# Patient Record
Sex: FEMALE | Race: WHITE
[De-identification: names, ages, dates, MRNs, and addresses within clinical notes are randomized per-mention and may not be internally consistent; named-entity substitution may affect disease eponyms.]

---
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
## 2020-03-27 NOTE — NUR
PT DISCHARGED TO HOME W/ MEDS ET INSTR.  PT TO F/U W/ PCP ET RETURN IF SYMPTOMS 
CHANGE OR GET WORSE.  UNDERSTANDING VOICED, NO QUESTIONS.

## 2021-09-27 ENCOUNTER — HOSPITAL ENCOUNTER (EMERGENCY)
Dept: HOSPITAL 75 - ER | Age: 31
Discharge: HOME | End: 2021-09-27
Payer: COMMERCIAL

## 2021-09-27 VITALS — WEIGHT: 165.35 LBS | HEIGHT: 67.99 IN | BODY MASS INDEX: 25.06 KG/M2

## 2021-09-27 VITALS — SYSTOLIC BLOOD PRESSURE: 118 MMHG | DIASTOLIC BLOOD PRESSURE: 71 MMHG

## 2021-09-27 DIAGNOSIS — M25.562: ICD-10-CM
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
## 2021-09-27 DIAGNOSIS — M25.532: ICD-10-CM

## 2021-09-27 DIAGNOSIS — M54.2: ICD-10-CM

## 2021-09-27 DIAGNOSIS — R07.89: Primary | ICD-10-CM

## 2021-09-27 DIAGNOSIS — M79.644: ICD-10-CM

## 2021-09-27 PROCEDURE — 73140 X-RAY EXAM OF FINGER(S): CPT

## 2021-09-27 PROCEDURE — 72170 X-RAY EXAM OF PELVIS: CPT

## 2021-09-27 PROCEDURE — 73562 X-RAY EXAM OF KNEE 3: CPT

## 2021-09-27 PROCEDURE — 71045 X-RAY EXAM CHEST 1 VIEW: CPT

## 2021-09-27 PROCEDURE — 73110 X-RAY EXAM OF WRIST: CPT

## 2021-09-27 PROCEDURE — 72125 CT NECK SPINE W/O DYE: CPT

## 2021-09-27 PROCEDURE — 70450 CT HEAD/BRAIN W/O DYE: CPT
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
## 2022-12-16 NOTE — ED HEADACHE
General


Chief Complaint:  Head/Cervical Problems


Stated Complaint:  MIGRAINE/FEVER 14 WKS PREG


Source:  patient


Exam Limitations:  no limitations





History of Present Illness


Date Seen by Provider:  Dec 16, 2022


Time Seen by Provider:  21:04


Initial Comments


32-year-old female presents to the emergency department today for migraine 

headache.  It is similar to her previous history of migraines.  Its been present

for 2 weeks.  At about the 1 week shelia it started to get a little bit better and

then 2 days later got worse again.  She has seen her primary doctor and 

obstetrician as she is 14 weeks pregnant.  They have offered her no further 

treatment recommendations.  She has taken Tylenol and Benadryl, caffeine without

any relief.  Headache is described as sharp stabbing diffuse and associated with

sensitivity to lights and sounds.  No nausea or vomiting.  It is exactly like 

her previous migraine headaches.





Allergies and Home Medications


Allergies


Coded Allergies:  


     Latex, Natural Rubber (Unverified  Allergy, Unknown, 3/27/20)


     Penicillins (Unverified  Allergy, Unknown, 3/27/20)





Patient Home Medication List


Home Medication List Reviewed:  Yes


Cyclobenzaprine HCl (Cyclobenzaprine HCl) 10 Mg Tablet, 10 MG PO Q8H PRN for 

SPASMS


   Prescribed by: ANDREAS FRITZ on 9/27/21 0281





Review of Systems


Review of Systems


Constitutional:  no symptoms reported


Eyes:  No Symptoms Reported


Ears, Nose, Mouth, Throat:  no symptoms reported


Respiratory:  no symptoms reported


Cardiovascular:  no symptoms reported


Gastrointestinal:  no symptoms reported


Genitourinary:  no symptoms reported


Musculoskeletal:  no symptoms reported


Skin:  no symptoms reported


Psychiatric/Neurological:  Headache





Past Medical-Social-Family Hx


Patient Social History


Tobacco Use?:  No


Use of E-Cig and/or Vaping dev:  No


Substance use?:  No


Alcohol Use?:  No


Pt feels they are or have been:  No





Immunizations Up To Date


Influenza Vaccine Up-to-Date:  No; Not Current


First/Initial COVID19 Vaccinat:  9/21


Second COVID19 Vaccination John:  9/21


Third COVID19 Vaccination Date:  9/21





Past Medical History


Surgeries:  Yes (DENTAL)


Respiratory:  No


Cardiac:  No


Neurological:  No


Reproductive Disorders:  No


Genitourinary:  No


Gastrointestinal:  No


Musculoskeletal:  No


Endocrine:  No


HEENT:  No


Cancer:  No


Psychosocial:  Yes


ADD/ADHD, Sleep Difficulties, Anxiety, ODD, Bipolar


Integumentary:  No





Family Medical History


Reviewed Nursing Family Hx





Physical Exam


Vital Signs


Capillary Refill :


Height, Weight, BMI


Height: '"


Weight: lbs. oz. kg; 35.00 BMI


Method:


General Appearance:  WD/WN, no apparent distress


HEENT:  PERRL/EOMI, normal ENT inspection, TMs normal, pharynx normal


Neck:  non-tender, supple, normal inspection


Cardiovascular:  regular rate, rhythm, no murmur


Respiratory:  chest non-tender, lungs clear, normal breath sounds, no 

respiratory distress, no accessory muscle use


Gastrointestinal:  normal bowel sounds, non tender, soft, no organomegaly


Psychiatric:  alert, oriented x 3


Crainal Nerves:  normal hearing, normal speech, PERRL


Motor/Sensory:  no motor deficit, no sensory deficit


Skin:  normal color, warm/dry





Progress/Results/Core Measures


Results/Orders


My Orders





Orders - RAJEEV SWAIN DO


Diphenhydramine Injection (Benadryl Inje (12/16/22 21:10)








Departure


Communication (Admissions)


Patient is hemodynamically stable no focal neurologic deficits.  She is afebrile

without any evidence for infectious cause.  No indication for imaging at this 

time.  Unfortunately given her pregnancy we are limited on what we can try for 

her headache.  She is given IM Benadryl here and discharged home recommending 

alternating Tylenol and Benadryl.





Impression





   Primary Impression:  


   Migraine headache


   Qualified Codes:  G43.909 - Migraine, unspecified, not intractable, without 

   status migrainosus


Disposition:  01 HOME, SELF-CARE


Condition:  Stable





Departure-Patient Inst.


Referrals:  


ANDREW SOLORIO MD (PCP)


Primary Care Physician








[facility]/LUC[facility] (Family)


Primary Care Physician


Patient Instructions:  Migraines (DC)





Add. Discharge Instructions:  


Unfortunately given your pregnancy there is mild else to give for your migraine.

 I have given you a shot of Benadryl hopefully will help with the next several 

hours.  Go home and rest.  Drink plenty of fluids.  Alternate Tylenol and 

Benadryl as needed for headache.  Return to the emergency department for any 

severe concerns.  Follow-up with family doctor for any nonemergent needs.





All discharge instructions reviewed with patient and/or family. Voiced 

understanding.











RAJEEV SWAIN DO            Dec 16, 2022 21:14

## 2023-02-14 NOTE — PHYSICIAN QUERY-FINAL DX
Clinic Account Progress/Dx


Physician Query:


Please give diagnosis





Please include # weeks gestation


Date of Service





Feb 13, 2023 at 17:40











TRANGJAN Feb 14, 2023 08:39

## 2023-04-11 NOTE — PHYSICIAN QUERY-FINAL DX
Clinic Account Progress/Dx


Physician Query:


Please give diagnosis





Please include # weeks gestation


Date of Service





Apr 10, 2023 at 16:12











TRANG,JAN Apr 11, 2023 08:29

## 2023-06-06 NOTE — HISTORY & PHYSICAL-OB
OB - Chief Complaint & HPI


Date/Time


Date of Admission:


Date of Admission:  2023 at 22:04


Date seen by a Provider:  2023


Time Seen by a Provider:  07:50





Chief Complaint/History


OB-Reason for Admission/Chief:  Induction of Labor


Hx :  6


Hx Para:  1041


Expected Date of Delivery:  2023


Gestational Age in Weeks:  39


Gestational Age in Days:  0


Indication for induction:  other (risk reducing)


History of Labs


A+, antibody neg, RNI. HIV/HepB/RPR NR. Gonorrhea neg. Chlamydia pos in first 

trimester, treated and FARHAD negative. 1 hour glucola elevated, 3 hour all normal.

GBS negative.





Allergies and Home Medications


Allergies


Coded Allergies:  


     Latex, Natural Rubber (Unverified  Allergy, Unknown, 3/27/20)


     Penicillins (Unverified  Allergy, Unknown, 3/27/20)





Patient Home Medication List


Home Medication List Reviewed:  Yes


Ferrous Sulfate (Ferosul) 325 Mg (65 Mg Iron) Tablet, 325 MG PO DAILY, 

(Reported)


   Entered as Reported by: ANDREW SOLORIO on 23


   Last Action: Reviewed


Fluticasone Propionate (Fluticasone Propionate) 50 Mcg/Actuation [place].susp, 1 

SPRAY NA DAILY, (Reported)


   Entered as Reported by: ANDREW SOLORIO on 23


   Last Action: Reviewed


Prenatal Vit W-Ca,Fe,FA(<1 mg) (Prenatal Vitamins) 27 Mg Iron-800 Mcg Tablet, 1 

EACH PO, (Reported)


   Entered as Reported by: NATHAN FRANK on 4/10/23 1721


   Last Action: Reviewed


Sertraline HCl (Sertraline HCl) 50 Mg Tablet, 50 MG PO DAILY, (Reported)


   Entered as Reported by: ANDREW SOLORIO on 23


   Last Action: Reviewed


Trazodone HCl (Trazodone HCl) 50 Mg Tablet, 50 MG PO, (Reported)


   Entered as Reported by: NATHAN FRANK on 4/10/23 1721


   Last Action: Reviewed


Discontinued Medications


Sertraline HCl (Sertraline HCl) 20 Mg/Ml Oral.conc, 20 MG PO, (Reported)


   Entered as Reported by: NATHAN FRANK on 4/10/23 1721


   Last Action: Discontinued





OB - History


Hx of Present Pregnancy


Prenatal Care:  Yes


Ultrasounds:  Normal mid trimester US


Obstetrical Complications:  None


Medical Complications:  Psychiatric (bipolar disorder, on sertaline and 

trazodone), Other (obesity)





Prenatal Information


Pregnancy Induced Hypertension:  No


Maternal Gestational Diabetes:  No


Postpartum Hemorrhage:  No





Obstetrical History


Hx :  6


Hx Para:  1


Hx # Term Pregnancies:  1


Hx #  Pregnancies:  0


Number of Living Children:  1


Hx Total # of Abortions (Spona:  4


Hx Multiple Gestation:  No


Hx Ectopic Pregnancy:  No


Hx Stillbirth:  No


Hx Pregnancy Complication:  No


Hx Pregnancy Induced Hypertens:  No


Hx Maternal Gestational Diabet:  No


Hx Postpartum Hemorrhage:  No





Delivery History


Hx Dystocia:  No


Hx Forceps Assisted Delivery:  No


Hx Vacuum Extraction Assisted:  No


Hx Placenta Abnormality:  No


Hx Fetal Distress:  No


Hx Large For Gestational Age I:  No


Hx Small for Gestational Age I:  No


Hx  Section:  No


Hx Vaginal Delivery Post C-Sec:  No


Hx Blood Disorders:  No


Adverse Rxn to Tranfusion:  No





Patient Past Medical History





PMHx:


Bipolar disorder





SurgHx:


[place] teeth extraction





Social History/Family History


Alcohol Use:  Denies Use


Recreational Drug Use:  Yes (history of THC use prior to pregnancy)


Smoking Cessation:  Never smoker


2nd Hand Smoke Exposure:  No





Immunizations


Influenza Vaccine Up-to-Date:  Yes; Up-to-Date


First/Initial COVID19 Vaccine:  2021


Second COVID19 Vaccination:  2021


COVID19 Vaccine :  Pfizer


Hepatitis A:  Yes


Hepatitis B:  Yes


Tetanus Booster (TDap):  Less than 5yrs (2023)


Rubella:  not immune


RPR/VDRL:  Negative


GBS Status:  Negative


HBsAG:  Negative





OB - Admission Exam


Physical Exam


Vitals:





Vital Signs








 23





 04:37


 


Temp 36.8


 


Pulse 73


 


Resp 18


 


B/P (MAP) 116/59 (78)


 


Pulse Ox 100


 


O2 Delivery Room Air








HEENT:  NCAT


Abdomen:  Non tender


Extremities:  Normal


Cervical Dilatation:  2cm


Effacement:  0%


Station:  -3


Membranes:  Intact


Fetal Heart Rate:  120's


Decelerations:  No Decelerations


Long Term Variability:  Average (6-25)


Contractions on Admission:  None





[place] Scoring Tool (Modified)


Dilation (cm):  1-2cm (1)


Effacement (%):  0-30%    (0)


Fetal Descent/Station:  -3        (0)


Cervix Consistency:  Medium(1)


Cervix Position:  Posterior  (0)


Add 1 point for:  Each previous vaginal delivery (1)


[place] Score:  3


Labs





Laboratory Tests








Test


 23


22:10 23


23:46 Range/Units


 


 


Urine Color YELLOW    


 


Urine Clarity SL CLOUDY    


 


Urine pH 7.0   5-9  


 


Urine Specific Gravity 1.010 L  1.016-1.022  


 


Urine Protein NEGATIVE   NEGATIVE  


 


Urine Glucose (UA) NEGATIVE   NEGATIVE  


 


Urine Ketones NEGATIVE   NEGATIVE  


 


Urine Nitrite NEGATIVE   NEGATIVE  


 


Urine Bilirubin NEGATIVE   NEGATIVE  


 


Urine Urobilinogen 0.2   < = 1.0  MG/DL


 


Urine Leukocyte Esterase 1+ H  NEGATIVE  


 


Urine RBC (Auto) NEGATIVE   NEGATIVE  


 


Urine RBC NONE    /HPF


 


Urine WBC 0-2    /HPF


 


Urine Squamous Epithelial


Cells 0-2 


 


  /HPF





 


Urine Crystals PRESENT H   /LPF


 


Urine Amorphous Sediment


 LARGE IVY


URATES H 


  /LPF





 


Urine Bacteria TRACE    /HPF


 


Urine Casts NONE    /LPF


 


Urine Mucus NEGATIVE    /LPF


 


Urine Culture Indicated NO    


 


White Blood Count


 


 10.5 


 4.3-11.0


10^3/uL


 


Red Blood Count


 


 3.90 


 3.80-5.11


10^6/uL


 


Hemoglobin  11.0 L 11.5-16.0  g/dL


 


Hematocrit  32 L 35-52  %


 


Mean Corpuscular Volume  82  80-99  fL


 


Mean Corpuscular Hemoglobin  28  25-34  pg


 


Mean Corpuscular Hemoglobin


Concent 


 34 


 32-36  g/dL





 


Red Cell Distribution Width  14.1  10.0-14.5  %


 


Platelet Count


 


 240 


 130-400


10^3/uL


 


Mean Platelet Volume  9.1  9.0-12.2  fL


 


Immature Granulocyte % (Auto)  0   %


 


Neutrophils (%) (Auto)  73  42-75  %


 


Lymphocytes (%) (Auto)  19  12-44  %


 


Monocytes (%) (Auto)  6  0-12  %


 


Eosinophils (%) (Auto)  0  0-10  %


 


Basophils (%) (Auto)  0  0-10  %


 


Neutrophils # (Auto)


 


 7.7 


 1.8-7.8


10^3/uL


 


Lymphocytes # (Auto)


 


 2.0 


 1.0-4.0


10^3/uL


 


Monocytes # (Auto)


 


 0.7 


 0.0-1.0


10^3/uL


 


Eosinophils # (Auto)


 


 0.0 


 0.0-0.3


10^3/uL


 


Basophils # (Auto)


 


 0.0 


 0.0-0.1


10^3/uL


 


Immature Granulocyte # (Auto)


 


 0.0 


 0.0-0.1


10^3/uL


 


Syphilis Total Antibody  Negative  Negative  











OB - Assessment/Plan/Diagnosis


Assessment


Admission Dx


Term intrauterine pregnancy at 39w0d


Induction of labor per patient request after discussion of risks and benefits


Rubella non-immune


GBS negative


Admission Status:  Inpatient Order (span 2 midnights)


Reason for Inpatient Admission:  


Labor, delivery and postpartum course





Plan


Plan:  Induction


Induction Method:  per Misoprostol Protocol


Problems:  


(1) Encounter for planned induction of labor


Assessment & Plan:  Received misoprostol two doses since midnight with change 

from 1 to 2+, now blanca every 2 minutes or more (nonpainful). Given some 

change and frequent contractions, discussed several options with patient 

including goode bulb for mechanical ripening, starting pitocin or expectant 

management. She would like to wait an hour or two and if she continues to change

will continue expectant management, if not, will begin pitocin.














ANDREW SOLORIO MD              2023 09:22

## 2023-06-06 NOTE — LABOR PROGRESS NOTE
Labor Progress Note


Labor Progress Note


Date Seen by Provider:  2023


Time Seen by Provider:  13:20


Subjective:


Pt starting to feel some pain with contractions. Difficult to trace fetal heart 

tones, nursing requests FSE for better management of pitocin.





Objective:


Cervical exam: 3.5/-1


Consistency: soft


Position: mid


Presentation: vertex


Fetal heart tones: 120 beats per minute, moderate variability, reactive


Tocometer: 3-4 ctx/10 minutes





Assessment/Plan:


Diane Trinh  is a (33  /Para  6 / 1,Gestational Age (wks)39 here for

induction of labor.


FSE/TOCO


Continue pitocin


Anesthesia: None, plans to have epidural, okay to start at any time


Anticipate vaginal delivery.





Vitals - Labs


Vital Signs - I&O





Vital Signs








  Date Time  Temp Pulse Resp B/P (MAP) Pulse Ox O2 Delivery O2 Flow Rate FiO2


 


23 11:02  73 18 121/72 (88)  Room Air  


 


23 10:47  68 18 138/60 (86)  Room Air  


 


23 10:30 36.8 74 18 128/79 (95)  Room Air  


 


23 10:15  82 18 129/78 (95)  Room Air  


 


23 10:00  81 18 128/78 (95)  Room Air  


 


23 09:45  76 18 128/74 (92)  Room Air  


 


23 08:10 36.8 75 18 123/63 (83) 100 Room Air  


 


23 04:37 36.8 73 18 116/59 (78) 100 Room Air  


 


23 01:06 36.5 82 18 116/58 (77) 98 Room Air  


 


23 23:52 36.3 71 20  98 Room Air  














I & O 


 


 23





 07:00


 


Intake Total 1000 ml


 


Balance 1000 ml











Labs


Laboratory Tests


23 22:10: 


Urine Color YELLOW, Urine Clarity SL CLOUDY, Urine pH 7.0, Urine Specific Grav

ity 1.010L, Urine Protein NEGATIVE, Urine Glucose (UA) NEGATIVE, Urine Ketones 

NEGATIVE, Urine Nitrite NEGATIVE, Urine Bilirubin NEGATIVE, Urine Urobilinogen 

0.2, Urine Leukocyte Esterase 1+H, Urine RBC (Auto) NEGATIVE, Urine RBC NONE, 

Urine WBC 0-2, Urine Squamous Epithelial Cells 0-2, Urine Crystals PRESENTH, 

Urine Amorphous Sediment LARGE IVY URATESH, Urine Bacteria TRACE, Urine Casts 

NONE, Urine Mucus NEGATIVE, Urine Culture Indicated NO


23 23:46: 


White Blood Count 10.5, Red Blood Count 3.90, Hemoglobin 11.0L, Hematocrit 32L, 

Mean Corpuscular Volume 82, Mean Corpuscular Hemoglobin 28, Mean Corpuscular 

Hemoglobin Concent 34, Red Cell Distribution Width 14.1, Platelet Count 240, 

Mean Platelet Volume 9.1, Immature Granulocyte % (Auto) 0, Neutrophils (%) 

(Auto) 73, Lymphocytes (%) (Auto) 19, Monocytes (%) (Auto) 6, Eosinophils (%) 

(Auto) 0, Basophils (%) (Auto) 0, Neutrophils # (Auto) 7.7, Lymphocytes # (Auto)

2.0, Monocytes # (Auto) 0.7, Eosinophils # (Auto) 0.0, Basophils # (Auto) 0.0, 

Immature Granulocyte # (Auto) 0.0, Syphilis Total Antibody Negative











ANDERW SOLORIO MD              2023 13:36

## 2023-06-06 NOTE — OB LABOR & DELIVERY RECORD
Vag Delivery Note


Vag Delivery Note


Date of Delivery: 23 





Preoperative Diagnosis: Diane Trinh  is a (33  /Para  6 / 1,

Gestational Age (wks)39with 0 days





Postoperative Diagnosis: Same


Surgeon: ANDREW SOLORIO 





Anesthesia: Epidural





Delivery Type: 





Findings: 


Viable female infant, apgars 7/8, weight 6#13


Lacerations: second degree perineal laceration, left periurethral abrasion


Intact placenta with 3 vessel cord. [place] cord, no nuchal cord, or shoulder 

dystocia





Estimated Blood Loss: 200 ml





Complications: None





Condition: Stable





Description of Procedure:





The patient is a 33 year old female who presented for induction of labor. She 

was admitted and informed consent was obtained. Her labor course was 

unremarkable. She progressed to complete dilatation and began to push. 





She was then set up for delivery. The infant's head was delivered atraumatically

in the OP position. The shoulders and remainder of the infant's body were then 

delivered without difficulty. Upon delivery, the infant was vigorous and placed 

on maternal chest and the mouth and nares were bulb suctioned. After a delay 

cord was doubly clamped and cut and the infant remained on maternal chest. An 

intact placenta with 3-vessel cord delivered via [place] and there was found to 

be minimal bleeding.~ Vigorous fundal massage was performed and the fundus was 

found to be firm. IV oxytocin was given. Examination of the vagina and perineum 

revealed a second degree perineal laceration repaired in the usual fashion with 

3-0 vicryl rapide suture. Following the repair, sponge, instrument and needle 

counts were correct. Mom and baby were both in stable condition in the labor 

suite.





Vitals - Labs


Vital Signs - I&O





Vital Signs








  Date Time  Temp Pulse Resp B/P (MAP) Pulse Ox O2 Delivery O2 Flow Rate FiO2


 


23 19:00  64 18 134/77 (96) 98 Room Air  


 


23 18:45  64 18 125/65 (85) 100 Room Air  


 


23 18:30  75 18 128/73 (91) 99 Room Air  


 


23 18:15  67 18 127/70 (89) 99 Room Air  


 


23 17:58  64 18 116/70 (85) 99 Room Air  


 


23 17:54  69 18 130/68 (88) 100 Room Air  


 


23 17:49  67 18 124/57 (79) 100 Room Air  


 


23 17:44  68 18 111/58 (75) 99 Room Air  


 


23 17:39  72 18 125/59 (81) 99 Room Air  


 


23 17:33  68 18 116/57 (76) 99 Room Air  


 


23 17:28  65 18 127/62 (83) 100 Room Air  


 


23 17:26  65 18 143/74 (97) 98 Room Air  


 


23 17:22  82 18 139/72 (94) 98 Room Air  


 


23 17:20  81 18 139/74 (95) 100 Room Air  


 


23 17:00 36.7 83 18 128/70 (89)  Room Air  


 


23 16:45  65 18 119/65 (83)  Room Air  


 


23 16:30  63 18 122/72 (89)  Room Air  


 


23 16:15  58 18 115/59 (77)  Room Air  


 


23 16:00  63 18 110/56 (74)  Room Air  


 


23 15:45  69 18 109/57 (74)  Room Air  


 


23 15:30  77 18 120/76 (91)  Room Air  


 


23 15:15  77 18 104/70 (81)  Room Air  


 


23 15:00  71 18 123/68 (86)  Room Air  


 


23 14:45  69 18 118/65 (82)  Room Air  


 


23 14:30 37.0 71 18 118/62 (80)  Room Air  


 


23 14:15  79 18 117/56 (76)  Room Air  


 


23 14:00  80 18 116/70 (85)  Room Air  


 


23 13:45  70 18 116/69 (85)  Room Air  


 


23 13:30  72 18 119/69 (86)  Room Air  


 


23 13:00  75 18 125/62 (83)  Room Air  


 


23 12:45 36.8 73 18 116/67 (83)  Room Air  


 


23 12:30  66 18 121/67 (85)  Room Air  


 


23 12:15  58 18 104/55 (71)  Room Air  


 


23 12:00  65 18 110/65 (80)  Room Air  


 


23 11:45  66 18 136/74 (94)  Room Air  


 


23 11:30  73 18 137/70 (92)  Room Air  


 


23 11:15  70 18 142/78 (99)  Room Air  


 


23 11:02  73 18 121/72 (88)  Room Air  


 


23 10:47  68 18 138/60 (86)  Room Air  


 


23 10:30 36.8 74 18 128/79 (95)  Room Air  


 


23 10:15  82 18 129/78 (95)  Room Air  


 


23 10:00  81 18 128/78 (95)  Room Air  


 


23 09:45  76 18 128/74 (92)  Room Air  


 


23 08:10 36.8 75 18 123/63 (83) 100 Room Air  


 


23 04:37 36.8 73 18 116/59 (78) 100 Room Air  


 


23 01:06 36.5 82 18 116/58 (77) 98 Room Air  


 


23 23:52 36.3 71 20  98 Room Air  














I & O 


 


 23





 06:59


 


Intake Total 1000 ml


 


Balance 1000 ml











Labs


Laboratory Tests


23 23:46: 


White Blood Count 10.5, Red Blood Count 3.90, Hemoglobin 11.0L, Hematocrit 32L, 

Mean Corpuscular Volume 82, Mean Corpuscular Hemoglobin 28, Mean Corpuscular 

Hemoglobin Concent 34, Red Cell Distribution Width 14.1, Platelet Count 240, 

Mean Platelet Volume 9.1, Immature Granulocyte % (Auto) 0, Neutrophils (%) 

(Auto) 73, Lymphocytes (%) (Auto) 19, Monocytes (%) (Auto) 6, Eosinophils (%) 

(Auto) 0, Basophils (%) (Auto) 0, Neutrophils # (Auto) 7.7, Lymphocytes # (Auto)

2.0, Monocytes # (Auto) 0.7, Eosinophils # (Auto) 0.0, Basophils # (Auto) 0.0, 

Immature Granulocyte # (Auto) 0.0, Syphilis Serology Non-Reactive, Syphilis 

Total Antibody Negative











ANDREW SOLORIO MD              2023 23:41

## 2023-06-07 NOTE — POSTPARTUM PROGRESS NOTE
Postpartum Note


Postpartum Note


Postpartum Day # 1





Subjective:


Patient is without complaints. Ambulating, voiding. Tolerating a regular diet 

without nausea or vomiting. Normal lochia. Pain is well controlled with oral 

pain medications. Breast feeding. States that she is having some perineal pain.





Objective:





Laboratory Tests








Test


 6/7/23


05:30 Range/Units


 


 


White Blood Count


 15.9 H


 4.3-11.0


10^3/uL


 


Red Blood Count


 3.69 L


 3.80-5.11


10^6/uL


 


Hemoglobin 10.2 L 11.5-16.0  g/dL


 


Hematocrit 31 L 35-52  %


 


Mean Corpuscular Volume 83  80-99  fL


 


Mean Corpuscular Hemoglobin 28  25-34  pg


 


Mean Corpuscular Hemoglobin


Concent 33 


 32-36  g/dL





 


Red Cell Distribution Width 13.8  10.0-14.5  %


 


Platelet Count


 226 


 130-400


10^3/uL


 


Mean Platelet Volume 9.5  9.0-12.2  fL


 


Immature Granulocyte % (Auto) 1   %


 


Neutrophils (%) (Auto) 86 H 42-75  %


 


Lymphocytes (%) (Auto) 9 L 12-44  %


 


Monocytes (%) (Auto) 5  0-12  %


 


Eosinophils (%) (Auto) 0  0-10  %


 


Basophils (%) (Auto) 0  0-10  %


 


Neutrophils # (Auto)


 13.7 H


 1.8-7.8


10^3/uL


 


Lymphocytes # (Auto)


 1.4 


 1.0-4.0


10^3/uL


 


Monocytes # (Auto)


 0.8 


 0.0-1.0


10^3/uL


 


Eosinophils # (Auto)


 0.0 


 0.0-0.3


10^3/uL


 


Basophils # (Auto)


 0.0 


 0.0-0.1


10^3/uL


 


Immature Granulocyte # (Auto)


 0.1 


 0.0-0.1


10^3/uL











Physical Exam:


General - Alert and oriented, no apparent distress


Abdomen - Soft, appropriately tender to palpation, non-distended, fundus firm at

umbilicus


Extremities - no edema, negative Tash's bilaterally 





Assessment:


34 yo G6 now P2 post-partum day # 1, status post spontaneous vaginal delivery at

39 weeks.


Recovering well, hemodynamically stable


Anemia of acute blood loss





Plan:


Routine postpartum care.


Encourage breast feeding.


Encourage ambulation.


Ferrous sulfate supplementation.


Plan for discharge tomorrow with f.u 6 weeks with Dr Taylor





Vitals - Labs


Vital Signs - I&O





Vital Signs








  Date Time  Temp Pulse Resp B/P (MAP) Pulse Ox O2 Delivery O2 Flow Rate FiO2


 


6/7/23 03:10 36.2 71 18 109/51 (70) 99 Room Air  


 


6/7/23 00:42  81 18 130/63 (85)  Room Air  


 


6/7/23 00:27  80 18 126/61 (82)  Room Air  


 


6/7/23 00:13  89 18 116/60 (78)  Room Air  


 


6/6/23 23:58  97 18 135/88 (104)  Room Air  


 


6/6/23 23:43  88 18 94/61 (72)  Room Air  


 


6/6/23 23:28  90 18 143/62 (89)  Room Air  


 


6/6/23 23:00   18 152/71 (98)  Room Air  


 


6/6/23 22:45  88 18 143/78 (99)  Room Air  


 


6/6/23 22:30  103 18 138/67 (90)  Room Air  


 


6/6/23 22:20  86 18 137/73 (94)  Room Air  


 


6/6/23 22:15  96 18 138/114 (122)  Room Air  


 


6/6/23 22:00  112 18 173/113 (133)  Room Air  


 


6/6/23 21:45  67 18 126/77 (93)  Room Air  


 


6/6/23 21:30  80 18 136/82 (100)  Room Air  


 


6/6/23 21:15  85 18 146/88 (107)  Room Air  


 


6/6/23 21:00  66 18 130/57 (81) 100 Room Air  


 


6/6/23 20:45  73 18 136/73 (94) 100 Room Air  


 


6/6/23 20:30  73 18 145/85 (105) 100 Room Air  


 


6/6/23 20:15  83 18 154/106 (122) 100 Room Air  


 


6/6/23 20:00  68 18 134/75 (94) 100 Room Air  


 


6/6/23 19:45  66 18 124/59 (80) 100 Room Air  


 


6/6/23 19:30 36.7 72 18 117/63 (81) 100 Room Air  


 


6/6/23 19:15  67 18 116/70 (85) 99 Room Air  


 


6/6/23 19:00  64 18 134/77 (96) 98 Room Air  


 


6/6/23 18:45  64 18 125/65 (85) 100 Room Air  


 


6/6/23 18:30  75 18 128/73 (91) 99 Room Air  


 


6/6/23 18:15  67 18 127/70 (89) 99 Room Air  


 


6/6/23 17:58  64 18 116/70 (85) 99 Room Air  


 


6/6/23 17:54  69 18 130/68 (88) 100 Room Air  


 


6/6/23 17:49  67 18 124/57 (79) 100 Room Air  


 


6/6/23 17:44  68 18 111/58 (75) 99 Room Air  


 


6/6/23 17:39  72 18 125/59 (81) 99 Room Air  


 


6/6/23 17:33  68 18 116/57 (76) 99 Room Air  


 


6/6/23 17:28  65 18 127/62 (83) 100 Room Air  


 


6/6/23 17:26  65 18 143/74 (97) 98 Room Air  


 


6/6/23 17:22  82 18 139/72 (94) 98 Room Air  


 


6/6/23 17:20  81 18 139/74 (95) 100 Room Air  


 


6/6/23 17:00 36.7 83 18 128/70 (89)  Room Air  


 


6/6/23 16:45  65 18 119/65 (83)  Room Air  


 


6/6/23 16:30  63 18 122/72 (89)  Room Air  


 


6/6/23 16:15  58 18 115/59 (77)  Room Air  


 


6/6/23 16:00  63 18 110/56 (74)  Room Air  


 


6/6/23 15:45  69 18 109/57 (74)  Room Air  


 


6/6/23 15:30  77 18 120/76 (91)  Room Air  


 


6/6/23 15:15  77 18 104/70 (81)  Room Air  


 


6/6/23 15:00  71 18 123/68 (86)  Room Air  


 


6/6/23 14:45  69 18 118/65 (82)  Room Air  


 


6/6/23 14:30 37.0 71 18 118/62 (80)  Room Air  


 


6/6/23 14:15  79 18 117/56 (76)  Room Air  


 


6/6/23 14:00  80 18 116/70 (85)  Room Air  


 


6/6/23 13:45  70 18 116/69 (85)  Room Air  














I & O 


 


 6/7/23





 07:00


 


Intake Total 2000 ml


 


Balance 2000 ml











Labs


Laboratory Tests


6/7/23 05:30: 


White Blood Count 15.9H, Red Blood Count 3.69L, Hemoglobin 10.2L, Hematocrit 31L

, Mean Corpuscular Volume 83, Mean Corpuscular Hemoglobin 28, Mean Corpuscular 

Hemoglobin Concent 33, Red Cell Distribution Width 13.8, Platelet Count 226, 

Mean Platelet Volume 9.5, Immature Granulocyte % (Auto) 1, Neutrophils (%) 

(Auto) 86H, Lymphocytes (%) (Auto) 9L, Monocytes (%) (Auto) 5, Eosinophils (%) 

(Auto) 0, Basophils (%) (Auto) 0, Neutrophils # (Auto) 13.7H, Lymphocytes # 

(Auto) 1.4, Monocytes # (Auto) 0.8, Eosinophils # (Auto) 0.0, Basophils # (Auto)

0.0, Immature Granulocyte # (Auto) 0.1











SLAVA ALBARADO MD                Jun 7, 2023 13:34

## 2023-06-07 NOTE — ANESTHESIA-REGIONAL POST-OP
Regional


Patient Condition


Mental Status:  Alert, Oriented x3


Circulation:  Same as Pre-Op


Headache:  Absent


Sensation:  Full Recovery


Motor Block:  Absent





Post Op Complications


Complications


None





Follow Up Care/Instructions


Patient Instructions


None needed.





Anesthesia/Patient Condition


Patient is doing well, no complaints, stable vital signs, no apparent adverse 

anesthesia problems.   


No complications reported per nursing.











NEHAL CASPER CRNA             Jun 7, 2023 09:43

## 2023-06-08 NOTE — DISCHARGE SUMMARY
Diagnosis/Chief Complaint


Date of Admission


2023 at 22:04


Date of Discharge


23


Admission Diagnosis


Admission Diagnosis


Third Trimester pregnancy


39 completed weeks gestation





Discharge Diagnosis





2nd degree perineal laceration repair


Anemia of acute blood loos





Discharge Summary-Simple/Stand


Procedures


Epidural placement





Discharge Physical Examination


Allergies:  


Coded Allergies:  


     mushroom (Verified  Allergy, Severe, 23)


     Latex, Natural Rubber (Unverified  Allergy, Unknown, 3/27/20)


     Penicillins (Unverified  Allergy, Unknown, 3/27/20)


Vitals & I&Os





Vital Sign - Last 12Hours








  Date Time  Temp Pulse Resp B/P (MAP) Pulse Ox O2 Delivery O2 Flow Rate FiO2


 


23 08:39 36.6 73 18 128/77 (94) 98 Room Air  








General Appearance:  Alert, Oriented X3, No Acute Distress


Respiratory:  Clear to Auscultation, Normal Air Movement


Cardiovascular:  Regular Rate, No Murmurs


Abdominal:  Normal Bowel Sounds, Soft, No Tenderness, Other (fundus firm at 

umbilicus)


Extremities:  Other (trace swelling present)


Neuro:  Normal Speech


Psych/Mental Status:  Mental Status NL, Mood NL





Hospital Course


See final discharge diagnosis.





Discharge


Condition at discharge


stable


Instructions to patient/family


Please see electronic discharge instructions given to patient.


Discharge Medications


Reviewed and agree with Discharge Medication list on patient's Discharge 

Instruction sheet











SLAVA ALBARADO MD                2023 13:33

## 2023-06-08 NOTE — DISCHARGE SUMMARY
Discharge Inst-[facility]


Reconcile Patient Problems


Problems Reviewed?:  Yes





Depart Medications


New, Converted or Re-Newed RX:  Transmitted to Pharmacy


New Medications:  


Docusate Sodium (Docusate Sodium) 100 Mg Capsule


100 MG PO BID, #14 CAP





Ibuprofen (Ibu) 600 Mg Tablet


600 MG PO Q6H, #60 TAB





 


Continued Medications:  


Ferrous Sulfate (Ferosul) 325 Mg (65 Mg Iron) Tablet


325 MG PO DAILY





Fluticasone Propionate (Fluticasone Propionate) 50 Mcg/Actuation [place].susp


1 SPRAY NA DAILY





Prenatal Vit W-Ca,Fe,FA(<1 mg) (Prenatal Vitamins) 27 Mg Iron-800 Mcg Tablet


1 EACH PO, TAB





Sertraline HCl (Sertraline HCl) 50 Mg Tablet


50 MG PO DAILY





Trazodone HCl (Trazodone HCl) 50 Mg Tablet


50 MG PO, TAB











Follow Up/Instructions


Goal/Follow Up:  


6 weeks with Dr Taylor





Activity


Activity:  Activity as Tolerated


Driving Instructions:  You May Drive


NO SMOKING:  NO SMOKING


Nothing Inside Vagina:  No Douching, No [place], No Tampons





Diet


Discharge Diet:  No Restrictions


Symptoms to Report to :  Bleeding Excessive, Fever Over 101 Degrees F


For Any Problems or Questions:  Contact Your Physician











SLAVA ALBARADO MD                Jun 8, 2023 13:36